# Patient Record
Sex: FEMALE | Race: WHITE | URBAN - NONMETROPOLITAN AREA
[De-identification: names, ages, dates, MRNs, and addresses within clinical notes are randomized per-mention and may not be internally consistent; named-entity substitution may affect disease eponyms.]

---

## 2022-07-21 ENCOUNTER — OFFICE VISIT (OUTPATIENT)
Dept: URBAN - NONMETROPOLITAN AREA CLINIC 4 | Facility: CLINIC | Age: 42
End: 2022-07-21
Payer: COMMERCIAL

## 2022-07-21 ENCOUNTER — WEB ENCOUNTER (OUTPATIENT)
Dept: URBAN - NONMETROPOLITAN AREA CLINIC 4 | Facility: CLINIC | Age: 42
End: 2022-07-21

## 2022-07-21 ENCOUNTER — LAB OUTSIDE AN ENCOUNTER (OUTPATIENT)
Dept: URBAN - NONMETROPOLITAN AREA CLINIC 4 | Facility: CLINIC | Age: 42
End: 2022-07-21

## 2022-07-21 VITALS
HEART RATE: 78 BPM | BODY MASS INDEX: 28.85 KG/M2 | DIASTOLIC BLOOD PRESSURE: 67 MMHG | TEMPERATURE: 97.5 F | HEIGHT: 67 IN | SYSTOLIC BLOOD PRESSURE: 105 MMHG | WEIGHT: 183.8 LBS

## 2022-07-21 DIAGNOSIS — R63.0 POOR APPETITE: ICD-10-CM

## 2022-07-21 DIAGNOSIS — R11.2 NAUSEA AND VOMITING, UNSPECIFIED VOMITING TYPE: ICD-10-CM

## 2022-07-21 DIAGNOSIS — R53.83 FATIGUE, UNSPECIFIED TYPE: ICD-10-CM

## 2022-07-21 DIAGNOSIS — Z98.84 STATUS POST LAPAROSCOPIC SLEEVE GASTRECTOMY: ICD-10-CM

## 2022-07-21 DIAGNOSIS — R10.11 RIGHT UPPER QUADRANT ABDOMINAL PAIN: ICD-10-CM

## 2022-07-21 DIAGNOSIS — R12 HEARTBURN: ICD-10-CM

## 2022-07-21 DIAGNOSIS — D50.9 IRON DEFICIENCY ANEMIA, UNSPECIFIED IRON DEFICIENCY ANEMIA TYPE: ICD-10-CM

## 2022-07-21 DIAGNOSIS — R63.4 WEIGHT LOSS: ICD-10-CM

## 2022-07-21 PROBLEM — 64379006: Status: ACTIVE | Noted: 2022-07-21

## 2022-07-21 PROCEDURE — 99204 OFFICE O/P NEW MOD 45 MIN: CPT | Performed by: REGISTERED NURSE

## 2022-07-21 RX ORDER — ONDANSETRON 4 MG/1
1 TABLET ON THE TONGUE AND ALLOW TO DISSOLVE TABLET, ORALLY DISINTEGRATING ORAL EVERY 8 HOURS
Qty: 60 | Refills: 1 | OUTPATIENT

## 2022-07-21 RX ORDER — POLYETHYLENE GLYCOL 3350, SODIUM SULFATE, SODIUM CHLORIDE, POTASSIUM CHLORIDE, ASCORBIC ACID, SODIUM ASCORBATE 140-9-5.2G
AS DIRECTED KIT ORAL
Qty: 1 BOTTLE | Refills: 0 | OUTPATIENT
Start: 2022-07-21 | End: 2022-07-22

## 2022-07-21 RX ORDER — IBUPROFEN 200 MG/1
1 TABLET WITH FOOD OR MILK AS NEEDED TABLET, FILM COATED ORAL THREE TIMES A DAY
Status: ACTIVE | COMMUNITY

## 2022-08-24 ENCOUNTER — CLAIMS CREATED FROM THE CLAIM WINDOW (OUTPATIENT)
Dept: URBAN - METROPOLITAN AREA CLINIC 4 | Facility: CLINIC | Age: 42
End: 2022-08-24
Payer: COMMERCIAL

## 2022-08-24 ENCOUNTER — OFFICE VISIT (OUTPATIENT)
Dept: URBAN - METROPOLITAN AREA SURGERY CENTER 14 | Facility: SURGERY CENTER | Age: 42
End: 2022-08-24
Payer: COMMERCIAL

## 2022-08-24 ENCOUNTER — TELEPHONE ENCOUNTER (OUTPATIENT)
Dept: URBAN - METROPOLITAN AREA CLINIC 54 | Facility: CLINIC | Age: 42
End: 2022-08-24

## 2022-08-24 DIAGNOSIS — K63.89 BACTERIAL OVERGROWTH SYNDROME: ICD-10-CM

## 2022-08-24 DIAGNOSIS — K63.89 OTHER SPECIFIED DISEASES OF INTESTINE: ICD-10-CM

## 2022-08-24 DIAGNOSIS — K31.89 OTHER DISEASES OF STOMACH AND DUODENUM: ICD-10-CM

## 2022-08-24 DIAGNOSIS — R12 BURNING REFLUX: ICD-10-CM

## 2022-08-24 DIAGNOSIS — K63.5 BENIGN COLON POLYP: ICD-10-CM

## 2022-08-24 DIAGNOSIS — K31.89 ACQUIRED DEFORMITY OF DUODENUM: ICD-10-CM

## 2022-08-24 DIAGNOSIS — K22.89 DILATATION OF ESOPHAGUS: ICD-10-CM

## 2022-08-24 DIAGNOSIS — R11.2 ACUTE NAUSEA WITH NONBILIOUS VOMITING: ICD-10-CM

## 2022-08-24 DIAGNOSIS — D50.9 ANEMIA: ICD-10-CM

## 2022-08-24 PROCEDURE — G8907 PT DOC NO EVENTS ON DISCHARG: HCPCS | Performed by: INTERNAL MEDICINE

## 2022-08-24 PROCEDURE — 88305 TISSUE EXAM BY PATHOLOGIST: CPT | Performed by: PATHOLOGY

## 2022-08-24 PROCEDURE — 88312 SPECIAL STAINS GROUP 1: CPT | Performed by: PATHOLOGY

## 2022-08-24 PROCEDURE — 43239 EGD BIOPSY SINGLE/MULTIPLE: CPT | Performed by: INTERNAL MEDICINE

## 2022-08-24 PROCEDURE — 45385 COLONOSCOPY W/LESION REMOVAL: CPT | Performed by: INTERNAL MEDICINE

## 2022-08-24 RX ORDER — IBUPROFEN 200 MG/1
1 TABLET WITH FOOD OR MILK AS NEEDED TABLET, FILM COATED ORAL THREE TIMES A DAY
Status: ACTIVE | COMMUNITY

## 2022-08-24 RX ORDER — ONDANSETRON 4 MG/1
1 TABLET ON THE TONGUE AND ALLOW TO DISSOLVE TABLET, ORALLY DISINTEGRATING ORAL EVERY 8 HOURS
Qty: 60 | Refills: 1 | Status: ACTIVE | COMMUNITY

## 2022-09-15 ENCOUNTER — OFFICE VISIT (OUTPATIENT)
Dept: URBAN - NONMETROPOLITAN AREA CLINIC 4 | Facility: CLINIC | Age: 42
End: 2022-09-15
Payer: COMMERCIAL

## 2022-09-15 ENCOUNTER — DASHBOARD ENCOUNTERS (OUTPATIENT)
Age: 42
End: 2022-09-15

## 2022-09-15 VITALS
WEIGHT: 177.4 LBS | TEMPERATURE: 96.6 F | BODY MASS INDEX: 27.84 KG/M2 | SYSTOLIC BLOOD PRESSURE: 114 MMHG | DIASTOLIC BLOOD PRESSURE: 70 MMHG | HEIGHT: 67 IN | HEART RATE: 85 BPM

## 2022-09-15 DIAGNOSIS — K31.2 GASTRIC HOURGLASS STRICTURE OR STENOSIS: ICD-10-CM

## 2022-09-15 DIAGNOSIS — K21.00 GASTROESOPHAGEAL REFLUX DISEASE WITH ESOPHAGITIS WITHOUT HEMORRHAGE: ICD-10-CM

## 2022-09-15 DIAGNOSIS — Z90.3 S/P GASTRIC SLEEVE PROCEDURE: ICD-10-CM

## 2022-09-15 DIAGNOSIS — D50.9 IRON DEFICIENCY ANEMIA, UNSPECIFIED IRON DEFICIENCY ANEMIA TYPE: ICD-10-CM

## 2022-09-15 DIAGNOSIS — R11.2 NAUSEA AND VOMITING, UNSPECIFIED VOMITING TYPE: ICD-10-CM

## 2022-09-15 DIAGNOSIS — R12 HEARTBURN: ICD-10-CM

## 2022-09-15 PROBLEM — 266433003: Status: ACTIVE | Noted: 2022-09-15

## 2022-09-15 PROBLEM — 87522002: Status: ACTIVE | Noted: 2022-07-21

## 2022-09-15 PROCEDURE — 99214 OFFICE O/P EST MOD 30 MIN: CPT | Performed by: REGISTERED NURSE

## 2022-09-15 RX ORDER — ONDANSETRON 4 MG/1
1 TABLET ON THE TONGUE AND ALLOW TO DISSOLVE TABLET, ORALLY DISINTEGRATING ORAL EVERY 8 HOURS
Qty: 60 | Refills: 1 | Status: ACTIVE | COMMUNITY

## 2022-09-15 RX ORDER — OMEPRAZOLE 20 MG/1
1 CAPSULE 30 MINUTES BEFORE A MEAL CAPSULE, DELAYED RELEASE ORAL TWICE A DAY
Qty: 120 CAPSULE | Refills: 0 | OUTPATIENT
Start: 2022-09-15

## 2022-09-15 RX ORDER — IBUPROFEN 200 MG/1
1 TABLET WITH FOOD OR MILK AS NEEDED TABLET, FILM COATED ORAL THREE TIMES A DAY
Status: DISCONTINUED | COMMUNITY

## 2022-09-15 NOTE — HPI-TODAY'S VISIT:
7/21/22: Pt is a 42 yo female with PMH of VIt D def, CHRISTIAN and s/p gastric sleeve in 2018 who was referred by Dr. Jacqueline High for evaluation of CHRISTIAN. A copy of this document will be sent to the referring physician.  Patient was  found to have CHRISTIAN last year. She started taking iron supplements, but not currently taking. No signs of active GI bleeding. Patient denies hematemesis, melena, or hematochezia, hematuria, or epistaxis. Patient denies any falls or trauma. Patient denies taking chronic NSAIDs or anti platelets or anti coagulant medications. She feels very fatigued and dizzy.  Labs on 6/30/21 show Hgb 11.6, Iron 71, TIBC 447, Iron Sat 16%, Ferritin 5 Labs on 6/24/22 show Hgb 12.3, Iron 100, TIBC 455, Iron Sat 22%  She also c/o heartburn, nausea, vomiting over the past year. States she has been unable to keep any PO intake down for past 3 weeks. She has a poor appetite. She reports acute onset pain in mid abdomen that radiated to right side that started last night, now resolved. Felt like her "gut was being ripped open." She took Ibuprofen. She has associated bloating. She has lost nearly 10 lbs. She had an abd US a few years ago that showed gallstones. No prior EGD or Colonoscopy. No recent imaging studies. There is no family history of colon polyps or cancer. She reports alternating diarrhea and constipation.  9/15/22: Pt RTC for f/u. Had EGD/colon 8/24/22. EGD findings: - LA Grade C reflux esophagitis with no bleeding. - A sleeve gastrectomy was found, characterized by moderate stenosis. Biopsied. - Normal duodenal bulb and second portion of the duodenum. Biopsied. Bx c/w benign inflammation Cscope findings: - The appendiceal orifice, transverse colon, ileocecal valve, hepatic flexure, ascending colon, cecum and terminal ileum are normal. - Tortuous colon. - Three 3 to 5 mm polyps at the recto-sigmoid colon, removed with a cold snare. Resected and retrieved. - Internal hemorrhoids. Bx c/w hyperplastic polyps. Rpt in 5 yrs She had UGIS w/ SBFT 9/9/22 IMPRESSION: 1. Gastroesophageal reflux with supine position.  2. Normal small bowel follow-through study She continues to c/o heartburn and nausea. She did not receive Rx for omeprazole. She is scheduled for repeat EGD on 10/7/22 at [facility].

## 2022-10-07 ENCOUNTER — OFFICE VISIT (OUTPATIENT)
Dept: URBAN - METROPOLITAN AREA MEDICAL CENTER 23 | Facility: MEDICAL CENTER | Age: 42
End: 2022-10-07
Payer: COMMERCIAL

## 2022-10-07 DIAGNOSIS — K95.89 COMPLICATION OF BARIATRIC PROCEDURE: ICD-10-CM

## 2022-10-07 DIAGNOSIS — K31.1 ACQUIRED GASTRIC OUTLET STENOSIS: ICD-10-CM

## 2022-10-07 PROCEDURE — 43245 EGD DILATE STRICTURE: CPT | Performed by: INTERNAL MEDICINE

## 2022-10-21 ENCOUNTER — OFFICE VISIT (OUTPATIENT)
Dept: URBAN - NONMETROPOLITAN AREA CLINIC 4 | Facility: CLINIC | Age: 42
End: 2022-10-21

## 2022-11-22 ENCOUNTER — OFFICE VISIT (OUTPATIENT)
Dept: URBAN - NONMETROPOLITAN AREA CLINIC 4 | Facility: CLINIC | Age: 42
End: 2022-11-22